# Patient Record
(demographics unavailable — no encounter records)

---

## 2024-10-18 NOTE — ASSESSMENT
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  #Neck Pain/spasms - C/w lorazepam 0.5mg PO TID PRN - Tizanidine 2mg PO q8h PRN  #Secretions - ATropine drops PRN  #ACP Discussed disease trajectory again today and specifically PEG tube placement. Has an appointment next week for G tube consultation. On discussing with patient, she was able to state "I don't think I want a feeding tube." We discussed that though she is able to get adequate calories now, she will lose her ability to swallow at some point and a decision about a future peg tube should be made now regardless of whether she is losing or gaining weight. I explained that many people choose not to get a feeding tube but if she feels that she would want one when her swallowing completely fails, she should get it now rather than wait for that to happen. She and  said they will think about it and would like a follow-up conversation with me once they go for their consultation. - Will discuss further GOC regarding PEG at next visit as well as discussing overarching goals and role of hospice.  F/u in 1 week -remote history  -EKG NSR at 69,  -not on AC -endometrial ca in 2001, with re-occurance 2015,   -currently on chemotherapy weekly (Cisplatin, Doxorubin, Adriamycin)   -Oncologist Dr. SCOT Dumont at [facility]  -F/U [facility] Recs -endometrial ca in 2001, with re-occurance 2015,   -currently on chemotherapy weekly (Cisplatin, Doxorubin, Adriamycin)   -Oncologist Dr. SCOT Dumont at [facility]  -F/U [facility] Recs -Chronic, stable  -continue home amlodipine, losartan with hold parameters

## 2024-10-18 NOTE — HISTORY OF PRESENT ILLNESS
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  Patient was diagnosed with PD by Dr. Yoder in Jan 2020 with initial symptom of left sided weakness, gait instability and FOG> Pt had a Brayan scan on 5/25/2021 which revealed findings consistent with Parkinsonian syndrome, right worse than the left. Was diagnosed with MSA-P while in  rehab confirmed by FDG-PET in July 2024.  Pt does stationary Thera cycle at home for 30 minutes 5-6 x a week. Pt currently participates in PT 2 x a week at home: 30-40 minutes each session Unable to participate in rock steady boxing at this time  Pt states sleep has worsened since weaned off lexapro, about 5- 6 hrs/night. Pt has difficulty staying asleep. She wakes up once a night. Pt states she experiences vivid dreams nightly. Pts  reports screaming and mumbling in her sleep 1-2 a month. Pt is currently on Clonazepam 0.5 mg QHS and Mirtazapine 30 mg. Pt states she experiences intermittent constipation and bowel incontinence (1- 2 x a month). Pt has a BM every 2 days. She currently Miralax QAM and Colace daily.  Providers: Neurologist - Dr. Segura ([facility])  Interval Hx: Was able to go out yesterday all afternoon. Went to the hairdresser and dinner with friends at a restaurant. Has started drinking Boosts as supplemental nutrition and  states patient gained a pound since last visit.  SYMPTOMS: #Secretions/drooling Drools excessively. Uses atropine drops with limited relief. Underwent parotid gland botox without much relief either. Stopped glycopyrrolate due to excessive mouth dryness.  #Pain Location - Neck Quality - Aching Radiation - None Timing - Constant Aggravating factors - None Minimal acceptable level (0-10 scale) - 3/10 Severity in last 24h (0-10 scale) - "severe" Current score (0-10 scale) - 5/10 Worse in the morning. Has head droop to the right and pain manifests there. Takes lorazepam 0.5mg PO BID PRN which helps slightly. Tried tizanidine 2mg at night with some improvement in morning pain. ISTOP Ref #: [id number]   #Weightloss Difficulty chewing and swallowing food and has lost significant weight over the past few months but by adding Boosts and increasing milkshakes, has increased weight by a pound.  #Anxiety/Depression On mirtazapine 30mg PO nightly and lorazepam 0.5mg PO BID PRN but lorazepam is for neck pain.  #Constipation Pt states she experiences intermittent constipation and bowel incontinence (1- 2 x a month). Pt has a BM every 2 days. She currently Miralax QAM and Colace daily.  #Insomnia She wakes up once a night due to neck pain. Pt states she experiences vivid dreams nightly. Pts  reports screaming and mumbling in her sleep 1-2 a month. Pt is currently on Mirtazapine 30 mg PO daily.  ROS: If [ ] blank, symptom not present Fatigue [ ] Nausea [ ] Loss of appetite [ ] Unintentional weight loss [ ] Constipation [ ] Diarrhea [ ] Anxiety [ ] Low mood [ ] Other symptoms: [x ] All other review of symptoms negative   SH: Lives with . No children. Limited family support other than her . Has a few hours of a HHA per day.   Advanced Directives: HCP - Coy Ewing () MOLST - none

## 2024-10-18 NOTE — PHYSICAL EXAM
[General Appearance - Alert] : alert [General Appearance - In No Acute Distress] : in no acute distress [Sclera] : the sclera and conjunctiva were normal [Outer Ear] : the ears and nose were normal in appearance [Hearing Threshold Finger Rub Not [place]] : hearing was normal [] : no respiratory distress [Respiration, Rhythm And Depth] : normal respiratory rhythm and effort [Impaired Insight] : insight and judgment were intact [FreeTextEntry1] : Tearful during interview, depressed mood

## 2024-10-25 NOTE — PHYSICAL EXAM
[General Appearance - Alert] : alert [General Appearance - In No Acute Distress] : in no acute distress [Sclera] : the sclera and conjunctiva were normal [Outer Ear] : the ears and nose were normal in appearance [Hearing Threshold Finger Rub Not [place]] : hearing was normal [] : no respiratory distress [Respiration, Rhythm And Depth] : normal respiratory rhythm and effort [FreeTextEntry1] : Verbal but very low volume, head droop to R side [Impaired Insight] : insight and judgment were intact [Affect] : the affect was normal

## 2024-10-25 NOTE — ASSESSMENT
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  #Neck Pain/spasms - Clonazepam 0.5mg PO TID PRN - Tizanidine 2mg PO q8h PRN  #Secretions - ATropine drops PRN  #Insomnia - Clonazepam 0.5mg PO nightly PRN  #Poor nutritional intake - Nutritionist referral placed by neuro. Will go for consultation - GI referral placed. Awaiting medical records to be sent to [facility] for further evaluation  #ACP Discussed disease trajectory again today and specifically PEG tube placement but unable to get evaluation this week. - Will f/u nutritional GOC after medical records sent to GI and has ability to get evaluated  F/u after GI visit

## 2024-10-25 NOTE — HISTORY OF PRESENT ILLNESS
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  Patient was diagnosed with PD by Dr. Yoder in Jan 2020 with initial symptom of left sided weakness, gait instability and FOG> Pt had a Brayan scan on 5/25/2021 which revealed findings consistent with Parkinsonian syndrome, right worse than the left. Was diagnosed with MSA-P while in  rehab confirmed by FDG-PET in July 2024.  Pt does stationary Thera cycle at home for 30 minutes 5-6 x a week. Pt currently participates in PT 2 x a week at home: 30-40 minutes each session Unable to participate in rock steady boxing at this time  Pt states sleep has worsened since weaned off lexapro, about 5- 6 hrs/night. Pt has difficulty staying asleep. She wakes up once a night. Pt states she experiences vivid dreams nightly. Pts  reports screaming and mumbling in her sleep 1-2 a month. Pt is currently on Clonazepam 0.5 mg QHS and Mirtazapine 30 mg. Pt states she experiences intermittent constipation and bowel incontinence (1- 2 x a month). Pt has a BM every 2 days. She currently Miralax QAM and Colace daily.  Providers: Neurologist - Dr. Segura ([facility])  Interval Hx: Went for her GI eval but didn't have any of her medical records from [facility] and neurology (GI was at [facility]) so couldn't be evaluated for feeding tube. Neuro referred patient to a nutritionist. Has been able to swallow soft foods and liquids but says that she often doesn't feel like eating and so it's difficult to get adequate nutrition.  SYMPTOMS: #Secretions/drooling Drools excessively. Uses atropine drops with limited relief. Underwent parotid gland botox without much relief either. Stopped glycopyrrolate due to excessive mouth dryness.  #Pain Location - Neck Quality - Aching Radiation - None Timing - Constant Aggravating factors - None Minimal acceptable level (0-10 scale) - 3/10 Severity in last 24h (0-10 scale) - "moderate" Current score (0-10 scale) - 5/10 Worse in the morning. Has head droop to the right and pain manifests there. Takes lorazepam 0.5mg PO BID PRN which helps slightly. Tried tizanidine 2mg at night with some improvement in morning pain. ISTOP Ref #: [id number]   #Weightloss Difficulty chewing and swallowing food and has lost significant weight over the past few months but by adding Boosts and increasing milkshakes, has increased weight by a pound.  #Anxiety/Depression On mirtazapine 30mg PO nightly and lorazepam 0.5mg PO BID PRN but lorazepam is for neck pain.  #Constipation Pt states she experiences intermittent constipation and bowel incontinence (1- 2 x a month). Pt has a BM every 2 days. She currently Miralax QAM and Colace daily.  #Insomnia She wakes up once a night due to neck pain. Pt states she experiences vivid dreams nightly. Pts  reports screaming and mumbling in her sleep 1-2 a month. Pt is currently on Mirtazapine 30 mg PO daily. Started clonasepam 0.5mg at night this week and it helped her sleep much better at night.  ROS: If [ ] blank, symptom not present Fatigue [ ] Nausea [ ] Loss of appetite [ ] Unintentional weight loss [ ] Constipation [ ] Diarrhea [ ] Anxiety [ ] Low mood [ ] Other symptoms: [x ] All other review of symptoms negative   SH: Lives with . No children. Limited family support other than her . Has a few hours of a HHA per day.   Advanced Directives: HCP - Coy Ewing () MOLST - none

## 2025-01-24 NOTE — ASSESSMENT
[FreeTextEntry1] : Assessment:  64-year-old female with a stage 3 pressure ulcer in the sacral region secondary to immobility and underlying multiple system atrophy. The patient is receiving appropriate topical care and has upcoming appointments with wound care and dermatology specialists. The  is concerned about infection risks and seeks reassurance and guidance.  Differential:  Risk of infection due to wound stage and location. Potential delayed wound healing due to underlying systemic disease and limited mobility. Caregiver stress and need for additional support. Plan:  Wound Management: Continue with current topical regimen (TriDerma, A&D ointment). Reinforce the importance of consistent use of Demora Sacrum silicone bandages. Educate caregiver on proper wound cleaning techniques: Clean the wound gently with saline before each dressing change. Avoid harsh antiseptics. If signs of infection arise (e.g., redness, swelling, warmth, foul odor, purulent discharge), contact the healthcare team promptly for wound culture and systemic antibiotics. Pressure Relief: Encourage frequent repositioning every 2 hours while in bed and every 15 minutes while seated.   Recommend a pressure-relieving mattress or cushion to reduce further risk. Nutrition and Hydration: Increase dietary protein and incorporate vitamins (A, C) and zinc to promote healing. Encourage adequate fluid intake to maintain hydration. Caregiver Support: Address 's anxiety with reassurance and education about wound healing expectations. Provide resources for caregiver support groups or counseling if stress persists. Encourage participation in specialist appointments to address his concerns. Specialist Follow-Up: Ensure timely follow-up with wound care specialist and dermatologist for advanced interventions, if needed. Discuss additional therapies such as negative pressure wound therapy (NPWT) or growth factors based on wound healing progress. Prevention of Recurrence: Maintain regular hygiene and use moisture barrier creams to protect skin from incontinence. Monitor for early signs of new pressure ulcers. Encourage physical therapy as tolerated to improve mobility and circulation. Follow-Up: Schedule a re-evaluation in one week or sooner if there are signs of infection or deterioration in wound status. Ensure specialists' recommendations are integrated into the care plan.

## 2025-01-24 NOTE — HISTORY OF PRESENT ILLNESS
[Home] : at home, [unfilled] , at the time of the visit. [Other Location: e.g. Home (Enter Location, City,State)___] : at [unfilled] [Verbal consent obtained from patient] : the patient, [unfilled] [FreeTextEntry8] : Subjective:  A 64-year-old female with a history of multiple system atrophy (Parkinsonian type) presents with a stage 3 pressure ulcer located above the anus, at the left side of the base of the tailbone. The patient's , who is actively involved in her care, has a picture of the wound on his iPad. The patient has been applying TriDerma, A&D ointment, and a Demora Sacrum silicone bandage to the affected area. Her  expressed significant anxiety over the risk of infection despite their efforts at wound care.    ROS as above otherwise neg   Physical Exam   General: Awake, alert and oriented. No acute distress. Well-developed, hydrated, and nourished. Appears stated age. Hestancy when speaking HEENT: NCAT, EOMI, neck with normal ROM, no visible masses Cardiopulmonary: RR wnl, no audible wheezes, no signs of respiratory distress Neurological: The patient is awake with slow speech. Thought process is intact. Extremities: atraumatic in appearance, moving upper extremities normally Skin: seen in picture with stage 3 decub, beefy, erythematous

## 2025-03-07 NOTE — HISTORY OF PRESENT ILLNESS
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 Accompanied by  Coy.  Patient was diagnosed with PD by Dr. Yoder in Jan 2020 with initial symptom of left sided weakness, gait instability and FOG> Pt had a Brayan scan on 5/25/2021 which revealed findings consistent with Parkinsonian syndrome, right worse than the left. Was diagnosed with MSA-P while in  rehab confirmed by FDG-PET in July 2024.  Providers: Neurologist - Dr. Segura (Movement Disorders) PCP - Dr. Levin ([facility])  Interval Hx: Weight 93lbs this week (was 98lbs 2 weeks ago). Has been having worsening dysphagia but still has days when able to chew and swallow solid foods.  SYMPTOMS: #Secretions/drooling Drools excessively. Uses atropine drops with limited relief. Underwent parotid gland botox without much relief either. Stopped glycopyrrolate due to excessive mouth dryness.  #Pain Location - Neck Quality - Aching Radiation - None Timing - Constant Aggravating factors - None Minimal acceptable level (0-10 scale) - 3/10 Severity in last 24h (0-10 scale) - 9/10 Current score (0-10 scale) - 6/10 Worse in the morning. Has head droop to the right and pain manifests there. Takes lorazepam 0.5mg PO TID PRN (only once a day usually) which helps slightly. Tried tizanidine 2mg at twice daily which helps with tension in neck. ISTOP Ref #: [id number]   #Weightloss Difficulty chewing and swallowing food and has lost significant weight over the past few months. Having more difficulty with solids but some days is still able to chew and swallow steak whereas other days can only drink liquids.  #Anxiety/Depression On mirtazapine 30mg PO nightly and lorazepam 0.5mg PO BID PRN but lorazepam is for neck pain.  #Diarrhea Having intermittently diarrhea.  #Insomnia Difficulty falling asleep. Feels it is due to pain from wound but has started using wedges at night from wound care and that alleviates pain from lying on sacral wound. Pt is currently on Mirtazapine 30 mg PO daily.  ROS: If [ ] blank, symptom not present Fatigue [ ] Nausea [ ] Loss of appetite [ ] Unintentional weight loss [ ] Constipation [ ] Diarrhea [ ] Anxiety [ ] Low mood [ ] Other symptoms: [x ] All other review of symptoms negative   SH: Lives with . No children. Limited family support other than her . Has a few hours of a HHA per day.   Advanced Directives: HCP - Coy Ewing () MOLST - none

## 2025-03-07 NOTE — ASSESSMENT
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  #Neck Pain/spasms - Clonazepam 0.5mg PO TID PRN - Tizanidine 2mg PO q8h PRN  #Secretions - Atropine drops PRN  #Insomnia - Clonazepam 0.5mg PO nightly PRN  #Poor nutritional intake - F/u with nutritionist. Already established care with them. - No longer interested in feeding tube  #ACP Discussed disease trajectory again today and specifically PEG tube placement. Meredith endorsed that she is no longer interested in a feeding tube when her dysphagia worsens and she is unable to swallow liquids. - GOC ongoing - No feeding tube  F/u in 1 month

## 2025-03-07 NOTE — REASON FOR VISIT
[Home] : at home, [unfilled] , at the time of the visit. [Other Location: e.g. Home (Enter Location, City,State)___] : at [unfilled] [Verbal consent obtained from patient] : the patient, [unfilled] [This encounter was initiated by telehealth (audio with video) and converted to telephone (audio only)] : This encounter was initiated by telehealth (audio with video) and converted to telephone (audio only) [Technical] : patient unable to effectively utilize tele-video due to technical issues.

## 2025-03-31 NOTE — HISTORY OF PRESENT ILLNESS
[Home] : at home, [unfilled] , at the time of the visit. [Medical Office: ([facility])___] : at the medical office located in  [Telehealth (audio & video)] : This visit was provided via telehealth using real-time 2-way audio visual technology. [Verbal consent obtained from patient] : the patient, [unfilled] [FreeTextEntry1] : - 64 yo F with multiple system atrophy (Parkinsonian type) here for symptom management and GOC discussion. Accompanied by  Coy.  Patient was diagnosed with PD by Dr. Yoder in Jan 2020 with initial symptom of left sided weakness, gait instability and FOG> Pt had a Brayan scan on 5/25/2021 which revealed findings consistent with Parkinsonian syndrome, right worse than the left. Was diagnosed with MSA-P while in  rehab confirmed by FDG-PET in July 2024.  Providers: Neurologist - Dr. Segura (Movement Disorders) PCP - Dr. Lvein ([facility])  Interval Hx: Weight 89lbs today, down from 98lbs 1-2 months ago. Has been having worsening dysphagia but still has days when able to chew and swallow solid foods.  SYMPTOMS: #Secretions/drooling Drools excessively. Uses atropine drops with limited relief.  #Pain Location - Neck Quality - Aching Radiation - None Timing - Constant Aggravating factors - None Minimal acceptable level (0-10 scale) - 3/10 Severity in last 24h (0-10 scale) - 9/10 Current score (0-10 scale) - 6/10 Worse in the morning. Has head droop to the right and pain manifests there. Takes lorazepam 0.5mg PO TID PRN (only once a day usually) which helps slightly. Tried tizanidine 2mg at twice daily which helps with tension in neck. ISTOP Ref #: [id number]   #Weightloss Difficulty chewing and swallowing food and has lost significant weight over the past few months. Sometimes can eat a dinner of rice and pork chops but other times is unable to. Still being given foods that require a substantial amount of chewing, such as peanut butter sandwiches on crusty bread. Weight appears to have decreased by 4 pounds per  since last month.  #Anxiety/Depression On mirtazapine 30mg PO nightly and lorazepam 0.5mg PO BID PRN but lorazepam is for neck pain.  #Insomnia Difficulty falling asleep. Feels it is due to pain from wound but has started using wedges at night from wound care and that alleviates pain from lying on sacral wound. Pt is currently on Mirtazapine 30 mg PO daily.  ROS: If [ ] blank, symptom not present Fatigue [ ] Nausea [ ] Loss of appetite [ ] Unintentional weight loss [ ] Constipation [ ] Diarrhea [ ] Anxiety [ ] Low mood [ ] Other symptoms: [x ] All other review of symptoms negative   SH: Lives with . No children. Limited family support other than her . Has a few hours of a HHA per day.   Advanced Directives: HCP - Coy Ewing () MOLST - none

## 2025-03-31 NOTE — GOALS
[Patient] : patient [DNR/Trial Intubation] : DNR/Trial Intubation [MOLST Completed] : MOLST Completed [Time Spent: ___ minutes] : I, personally, spent [unfilled] minutes on advance care planning services with the patient.  This time is separate and distinct from any other care management services provided on this date [FreeTextEntry1] : Coy Ewing [FreeTextEntry2] :  [FreeTextEntry7] : Spoke again about her wishes regarding a feeding tube and she expressed that she does not want a feeding tube. I explained that as her swallowing worsens, this may limit her life expectancy and though she was tearful, she shook her head that she still did not want a feeding tube. We also discussed code status and her  explained that she had previously expressed a wish to be DNR with an  in a living will. I asked if that was still her wish, discussing the morbidity of CPR with her, and she stated that it was and she did not want CPR. [de-identified] : No feeding tube [LastACPVerDate] : 03/31/2025

## 2025-03-31 NOTE — PHYSICAL EXAM
[General Appearance - Alert] : alert [General Appearance - In No Acute Distress] : in no acute distress [Sclera] : the sclera and conjunctiva were normal [] : no respiratory distress [Respiration, Rhythm And Depth] : normal respiratory rhythm and effort [FreeTextEntry1] : Verbal but very low volume, head droop to R side [Impaired Insight] : insight and judgment were intact [Affect] : the affect was normal

## 2025-03-31 NOTE — ASSESSMENT
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  #Neck Pain/spasms - Clonazepam 0.5mg PO TID PRN - Tizanidine 2mg PO q8h PRN  #Secretions - Atropine drops PRN  #Insomnia - Clonazepam 0.5mg PO nightly PRN  #Poor nutritional intake - F/u with nutritionist. Already established care with them. - No longer interested in feeding tube  #ACP See separate GOC note for further details. - MOLST: DNR, no feeding tube  F/u in 1-2 months

## 2025-03-31 NOTE — GOALS
[Patient] : patient [DNR/Trial Intubation] : DNR/Trial Intubation [MOLST Completed] : MOLST Completed [Time Spent: ___ minutes] : I, personally, spent [unfilled] minutes on advance care planning services with the patient.  This time is separate and distinct from any other care management services provided on this date [FreeTextEntry1] : Coy Ewing [FreeTextEntry2] :  [FreeTextEntry7] : Spoke again about her wishes regarding a feeding tube and she expressed that she does not want a feeding tube. I explained that as her swallowing worsens, this may limit her life expectancy and though she was tearful, she shook her head that she still did not want a feeding tube. We also discussed code status and her  explained that she had previously expressed a wish to be DNR with an  in a living will. I asked if that was still her wish, discussing the morbidity of CPR with her, and she stated that it was and she did not want CPR. [de-identified] : No feeding tube [LastACPVerDate] : 03/31/2025

## 2025-03-31 NOTE — HISTORY OF PRESENT ILLNESS
[Home] : at home, [unfilled] , at the time of the visit. [Medical Office: ([facility])___] : at the medical office located in  [Telehealth (audio & video)] : This visit was provided via telehealth using real-time 2-way audio visual technology. 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 Accompanied by  Coy.  Patient was diagnosed with PD by Dr. Yoder in Jan 2020 with initial symptom of left sided weakness, gait instability and FOG> Pt had a Brayan scan on 5/25/2021 which revealed findings consistent with Parkinsonian syndrome, right worse than the left. Was diagnosed with MSA-P while in  rehab confirmed by FDG-PET in July 2024.  Providers: Neurologist - Dr. Segura ([facility]) PCP - Dr. Levin ([facility])  Interval Hx: Weight 89lbs today, down from 98lbs 1-2 months ago. Has been having worsening dysphagia but still has days when able to chew and swallow solid foods.  SYMPTOMS: #Secretions/drooling Drools excessively. Uses atropine drops with limited relief.  #Pain Location - Neck Quality - Aching Radiation - None Timing - Constant Aggravating factors - None Minimal acceptable level (0-10 scale) - 3/10 Severity in last 24h (0-10 scale) - 9/10 Current score (0-10 scale) - 6/10 Worse in the morning. Has head droop to the right and pain manifests there. Takes lorazepam 0.5mg PO TID PRN (only once a day usually) which helps slightly. Tried tizanidine 2mg at twice daily which helps with tension in neck. ISTOP Ref #: [id number]   #Weightloss Difficulty chewing and swallowing food and has lost significant weight over the past few months. Sometimes can eat a dinner of rice and pork chops but other times is unable to. Still being given foods that require a substantial amount of chewing, such as peanut butter sandwiches on crusty bread. Weight appears to have decreased by 4 pounds per  since last month.  #Anxiety/Depression On mirtazapine 30mg PO nightly and lorazepam 0.5mg PO BID PRN but lorazepam is for neck pain.  #Insomnia Difficulty falling asleep. Feels it is due to pain from wound but has started using wedges at night from wound care and that alleviates pain from lying on sacral wound. Pt is currently on Mirtazapine 30 mg PO daily.  ROS: If [ ] blank, symptom not present Fatigue [ ] Nausea [ ] Loss of appetite [ ] Unintentional weight loss [ ] Constipation [ ] Diarrhea [ ] Anxiety [ ] Low mood [ ] Other symptoms: [x ] All other review of symptoms negative   SH: Lives with . No children. Limited family support other than her . Has a few hours of a HHA per day.   Advanced Directives: HCP - Coy Ewing () MOLST - none

## 2025-03-31 NOTE — ASSESSMENT
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  #Neck Pain/spasms - Clonazepam 0.5mg PO TID PRN - Tizanidine 2mg PO q8h PRN  #Secretions - Atropine drops PRN  #Insomnia - Clonazepam 0.5mg PO nightly PRN  #Poor nutritional intake - F/u with nutritionist. Already established care with them. - No longer interested in feeding tube  #ACP See separate GOC note for further details. - MOLST: DNR, no feeding tube  F/u in 1-2 months

## 2025-04-03 NOTE — ASSESSMENT
[FreeTextEntry1] : 65F with excessive salivation with inability to tolerate oral secretions and nasal drainage. She has multiple system atrophy (Parkinsonian type) and is seen by neuro and palliative care. Her sx have all progressively worsened as her underlying condition has progressed. She is losing weight and is having progressive dysphagia and immobility and overall worsening cachexia. She uses atropine drops which do not help much she has also had botox to salivary lands which did not provide much extended relief. She is exclusively feeds with PO intake - gtube has been recommended but pt refuses. Head and neck exam is unremarkable. Pt is cachectic w difficulty holding her head up with low and weak voice. Her dysphagia and oronasal sx are due to her underlying progressive neurologic condition. She can atrovent nasal sprays and perhaps try more botox to salivary glands, but these are all temporary fixes which may give some short term relief. I advised her to followup closely with her PCP and neurologist to establish a plan and GOC. I reached out to neurology to expedite an appt soon - they said they will email me back within the week.

## 2025-04-03 NOTE — HISTORY OF PRESENT ILLNESS
[de-identified] : 65F here for initial evaluation.  She was referred for excessive salivation with inability to tolerate oral secretions and nasal drainage. She has uses atropine drops which do not help much she has also had botox to salivary glands which did not provide much extended relief.  She has multiple system atrophy (Parkinsonian type) and is seen by neuro and palliative care.  She is losing weight and is having progressive dysphagia and immobility and overall worsening cachexia. Exclusively feeds with PO intake - gtube has been recommended but pt refuses.

## 2025-05-12 NOTE — PHYSICAL EXAM
[General Appearance - Alert] : alert [General Appearance - In No Acute Distress] : in no acute distress [FreeTextEntry1] : Head drooping to R side [] : no respiratory distress [Respiration, Rhythm And Depth] : normal respiratory rhythm and effort [Impaired Insight] : insight and judgment were intact [Affect] : the affect was normal

## 2025-05-12 NOTE — HISTORY OF PRESENT ILLNESS
[FreeTextEntry1] : - 66 yo F with multiple system atrophy (Parkinsonian type) here for symptom management and GOC discussion. Accompanied by  Coy.  Patient was diagnosed with PD by Dr. Yoder in Jan 2020 with initial symptom of left sided weakness, gait instability and FOG> Pt had a Brayan scan on 5/25/2021 which revealed findings consistent with Parkinsonian syndrome, right worse than the left. Was diagnosed with MSA-P while in  rehab confirmed by FDG-PET in July 2024.  Providers: Neurologist - Dr. Segura ([facility]) PCP - Dr. Levin ([facility])  Interval Hx: Weight stable at 89 pounds. Started speech therapy and PT. Getting services via [facility]. Still getting wound care.  SYMPTOMS: #Secretions/drooling Drools excessively but better managed on atropine drops and glycopyrrolate 1mg PO TID.  #Pain Location - Neck Quality - Aching Radiation - None Timing - Constant Aggravating factors - None Minimal acceptable level (0-10 scale) - 3/10 Severity in last 24h (0-10 scale) - 6/10 Current score (0-10 scale) - 4/10 Worse in the morning. Has head droop to the right and pain manifests there. Takes lorazepam 0.5mg PO TID PRN (only once a day usually) which helps slightly. Also uses tizanidine 2mg at most twice daily, usually once in the morning with a heating pad, which helps with tension in neck. ISTOP Ref #: [id number]   #Weightloss Difficulty chewing and swallowing food but there are certain solid foods she can eat. Some days are better than others so she will eat well but then eat poorly on other days. Weight has been stable the past 6 weeks. Sees a nutritionist as well.  #Anxiety/Depression On mirtazapine 30mg PO nightly and lorazepam 0.5mg PO BID PRN but lorazepam is for neck pain.  #Insomnia Difficulty falling asleep. Feels it is due to pain from wound but has started using wedges at night from wound care and that alleviates pain from lying on sacral wound. Pt is currently on Mirtazapine 30 mg PO daily.  ROS: If [ ] blank, symptom not present Fatigue [ ] Nausea [ ] Loss of appetite [ ] Unintentional weight loss [ ] Constipation [ ] Diarrhea [ ] Anxiety [ ] Low mood [ ] Other symptoms: [x ] All other review of symptoms negative   SH: Lives with . No children. Limited family support other than her . Has a few hours of a HHA per day.   Advanced Directives: HCP - Coy Ewing () MOLST - none

## 2025-05-12 NOTE — ASSESSMENT
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  #Neck Pain/spasms - Lorazepam 0.5mg PO TID PRN - Tizanidine 2mg PO q8h PRN  #Secretions - Atropine drops PRN  #Insomnia - Lorazepam 0.5mg PO nightly PRN  #Poor nutritional intake Intake fluctuates but weight has been stable recently. - F/u with nutritionist. Has already established care with them. - Focusing more on soft and liquid nutrition - Not interested in feeding tube  #ACP See separate GOC note from 3/31/25 for further details. Today discussed with  how she is not yet ready to stop rehabilitation services and not ready for hospice. However, she does have bad days where she feels that she doesn't want to continue. Then good days come around and she feels better and is not ready for hospice. - MOLST: DNR, no feeding tube - Not yet ready for hospice  F/u in 1-2 months

## 2025-05-12 NOTE — HISTORY OF PRESENT ILLNESS
[FreeTextEntry1] : - 64 yo F with multiple system atrophy (Parkinsonian type) here for symptom management and GOC discussion. Accompanied by  Coy.  Patient was diagnosed with PD by Dr. Yoder in Jan 2020 with initial symptom of left sided weakness, gait instability and FOG> Pt had a Brayan scan on 5/25/2021 which revealed findings consistent with Parkinsonian syndrome, right worse than the left. Was diagnosed with MSA-P while in  rehab confirmed by FDG-PET in July 2024.  Providers: Neurologist - Dr. Segura ([facility]) PCP - Dr. Levin ([facility])  Interval Hx: Weight stable at 89 pounds. Started speech therapy and PT. Getting services via [facility]. Still getting wound care.  SYMPTOMS: #Secretions/drooling Drools excessively but better managed on atropine drops and glycopyrrolate 1mg PO TID.  #Pain Location - Neck Quality - Aching Radiation - None Timing - Constant Aggravating factors - None Minimal acceptable level (0-10 scale) - 3/10 Severity in last 24h (0-10 scale) - 6/10 Current score (0-10 scale) - 4/10 Worse in the morning. Has head droop to the right and pain manifests there. Takes lorazepam 0.5mg PO TID PRN (only once a day usually) which helps slightly. Also uses tizanidine 2mg at most twice daily, usually once in the morning with a heating pad, which helps with tension in neck. ISTOP Ref #: [id number]   #Weightloss Difficulty chewing and swallowing food but there are certain solid foods she can eat. Some days are better than others so she will eat well but then eat poorly on other days. Weight has been stable the past 6 weeks. Sees a nutritionist as well.  #Anxiety/Depression On mirtazapine 30mg PO nightly and lorazepam 0.5mg PO BID PRN but lorazepam is for neck pain.  #Insomnia Difficulty falling asleep. Feels it is due to pain from wound but has started using wedges at night from wound care and that alleviates pain from lying on sacral wound. Pt is currently on Mirtazapine 30 mg PO daily.  ROS: If [ ] blank, symptom not present Fatigue [ ] Nausea [ ] Loss of appetite [ ] Unintentional weight loss [ ] Constipation [ ] Diarrhea [ ] Anxiety [ ] Low mood [ ] Other symptoms: [x ] All other review of symptoms negative   SH: Lives with . No children. Limited family support other than her . Has a few hours of a HHA per day.   Advanced Directives: HCP - Coy Ewing () MOLST - none

## 2025-06-25 NOTE — ASSESSMENT
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  #Neck Pain/spasms - Lorazepam 0.5mg PO TID PRN - Tizanidine 2mg PO q8h PRN  #Secretions - Atropine drops PRN - Glycopyrrolate 1mg PO TID PRN  #Insomnia - Lorazepam 0.5mg PO nightly PRN  #Poor nutritional intake Intake fluctuates but weight has been stable recently. - F/u with nutritionist. Has already established care with them. - Focusing more on soft and liquid nutrition - Not interested in feeding tube  #ACP See GOC note from 3/31/25. Discussed hospice again today given her clinical decline over the past 1-2 months. She is not going out anymore and is getting weaker. We discussed hospice again, especially given her recurrent hypotensive episodes and wish to forego further ER evaluation or treatment in the hospital. Her goal is to stay at home and be comfortable. Initially, both Coy and Meredith were agreeable to hospice but after our visit, patient's  called back to ask about the ability to do PT on hospice. I explained that it is not covered by hospice and because he feels Meredith is getting significant enjoyment out of doing PT, he does not want to stop that to enroll in hospice yet. - MOLST: DNR, trial of intubation, no feeding tube - Hospice referral on hold.  F/u in 1 month

## 2025-06-25 NOTE — ASSESSMENT
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  #Neck Pain/spasms - Lorazepam 0.5mg PO TID PRN - Tizanidine 2mg PO q8h PRN  #Secretions - Atropine drops PRN - Glycopyrrolate 1mg PO TID PRN  #Insomnia - Lorazepam 0.5mg PO nightly PRN  #Poor nutritional intake Intake fluctuates but weight has been stable recently. - F/u with nutritionist. Has already established care with them. - Focusing more on soft and liquid nutrition - Not interested in feeding tube  #ACP See GOC note from 3/31/25. Discussed hospice again today given her clinical decline over the past 1-2 months. She is not going out anymore and is getting weaker. We discussed hospice again, especially given her recurrent hypotensive episodes and wish to forego further ER evaluation or treatment in the hospital. Her goal is to stay at home and be comfortable. Initially, both Coy and Meredith were agreeable to hospice but after our visit, patient's  called back to ask about the ability to do PT on hospice. I explained that it is not covered by hospice and because he feels Meredith is getting significant enjoyment out of doing PT, he does not want to stop that to enroll in hospice yet. - MOLST: DNR, trial of intubation, no feeding tube - Hospice referral on hold.  F/u in 1 month

## 2025-06-25 NOTE — HISTORY OF PRESENT ILLNESS
[Home] : at home, [unfilled] , at the time of the visit. [Medical Office: ([facility])___] : at the medical office located in  [Telehealth (audio & video)] : This visit was provided via telehealth using real-time 2-way audio visual technology. 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 Accompanied by  Coy.  Patient was diagnosed with PD by Dr. Yoder in Jan 2020 with initial symptom of left sided weakness, gait instability and FOG> Pt had a Brayan scan on 5/25/2021 which revealed findings consistent with Parkinsonian syndrome, right worse than the left. Was diagnosed with MSA-P while in  rehab confirmed by FDG-PET in July 2024.  Providers: Neurologist - Dr. Segura ([facility]) PCP - Dr. Levin ([facility])  Interval Hx: Less active, unable to get out of the house over this past month. Intermittently eating solid foods. Still enjoying PT though she is able to do less physically. Has been having episodes of hypotension at home, down to SBP of 70s-80s which then improve. When they have alerted me to this, we discussed going to the ER but they are not interested in further hospital care and want patient to stay home and be kept comfortable.  SYMPTOMS: #Secretions/drooling Drools excessively but better managed on atropine drops and glycopyrrolate 1mg PO TID. No longer getting botox injections.  #Pain Location - Neck Quality - Aching Radiation - None Timing - Constant Aggravating factors - None Minimal acceptable level (0-10 scale) - 3/10 Severity in last 24h (0-10 scale) - 6/10 Current score (0-10 scale) - 4/10 Worse in the morning. Has head droop to the right and pain manifests there. Takes lorazepam 0.5mg PO TID PRN (only once a day usually) which helps slightly. Also uses tizanidine 2mg at most twice daily, usually once in the morning with a heating pad, which helps with tension in neck. ISTOP Ref #: [id number]   #Weightloss Difficulty chewing and swallowing food but there are certain solid foods she can eat. Some days are better than others so she will eat well but then eat poorly on other days. Follows with a nutritionist.  #Anxiety/Depression On mirtazapine 30mg PO nightly and lorazepam 0.5mg PO BID PRN but lorazepam is for neck pain.  #Insomnia Difficulty falling asleep. Feels it is due to pain from wound but has started using wedges at night from wound care and that alleviates pain from lying on sacral wound. Pt is currently on Mirtazapine 30 mg PO daily.  ROS: If [ ] blank, symptom not present Fatigue [ ] Nausea [ ] Loss of appetite [ ] Unintentional weight loss [ ] Constipation [ ] Diarrhea [ ] Anxiety [ ] Low mood [ ] Other symptoms: [x ] All other review of symptoms negative   SH: Lives with . No children. Limited family support other than her . Has a few hours of a HHA per day.   Advanced Directives: HCP - Coy Ewing () MOLST - DNR/trial of intubation

## 2025-06-25 NOTE — PHYSICAL EXAM
[General Appearance - Alert] : alert [General Appearance - In No Acute Distress] : in no acute distress [] : no respiratory distress [Respiration, Rhythm And Depth] : normal respiratory rhythm and effort [FreeTextEntry1] : Verbal but very low volume speech, head droop to R side [Impaired Insight] : insight and judgment were intact [Affect] : the affect was normal

## 2025-06-25 NOTE — HISTORY OF PRESENT ILLNESS
[Home] : at home, [unfilled] , at the time of the visit. [Medical Office: ([facility])___] : at the medical office located in  [Telehealth (audio & video)] : This visit was provided via telehealth using real-time 2-way audio visual technology. 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 Accompanied by  Coy.  Patient was diagnosed with PD by Dr. Yoder in Jan 2020 with initial symptom of left sided weakness, gait instability and FOG> Pt had a Brayan scan on 5/25/2021 which revealed findings consistent with Parkinsonian syndrome, right worse than the left. Was diagnosed with MSA-P while in  rehab confirmed by FDG-PET in July 2024.  Providers: Neurologist - Dr. Segura ([facility]) PCP - Dr. Levin ([facility])  Interval Hx: Less active, unable to get out of the house over this past month. Intermittently eating solid foods. Still enjoying PT though she is able to do less physically. Has been having episodes of hypotension at home, down to SBP of 70s-80s which then improve. When they have alerted me to this, we discussed going to the ER but they are not interested in further hospital care and want patient to stay home and be kept comfortable.  SYMPTOMS: #Secretions/drooling Drools excessively but better managed on atropine drops and glycopyrrolate 1mg PO TID. No longer getting botox injections.  #Pain Location - Neck Quality - Aching Radiation - None Timing - Constant Aggravating factors - None Minimal acceptable level (0-10 scale) - 3/10 Severity in last 24h (0-10 scale) - 6/10 Current score (0-10 scale) - 4/10 Worse in the morning. Has head droop to the right and pain manifests there. Takes lorazepam 0.5mg PO TID PRN (only once a day usually) which helps slightly. Also uses tizanidine 2mg at most twice daily, usually once in the morning with a heating pad, which helps with tension in neck. ISTOP Ref #: [id number]   #Weightloss Difficulty chewing and swallowing food but there are certain solid foods she can eat. Some days are better than others so she will eat well but then eat poorly on other days. Follows with a nutritionist.  #Anxiety/Depression On mirtazapine 30mg PO nightly and lorazepam 0.5mg PO BID PRN but lorazepam is for neck pain.  #Insomnia Difficulty falling asleep. Feels it is due to pain from wound but has started using wedges at night from wound care and that alleviates pain from lying on sacral wound. Pt is currently on Mirtazapine 30 mg PO daily.  ROS: If [ ] blank, symptom not present Fatigue [ ] Nausea [ ] Loss of appetite [ ] Unintentional weight loss [ ] Constipation [ ] Diarrhea [ ] Anxiety [ ] Low mood [ ] Other symptoms: [x ] All other review of symptoms negative   SH: Lives with . No children. Limited family support other than her . Has a few hours of a HHA per day.   Advanced Directives: HCP - Coy Ewing () MOLST - DNR/trial of intubation

## 2025-07-25 NOTE — GOALS
[Patient] : patient [Time Spent: ___ minutes] : I, personally, spent [unfilled] minutes on advance care planning services with the patient.  This time is separate and distinct from any other care management services provided on this date [FreeTextEntry1] : Coy Ewing [FreeTextEntry2] :  [FreeTextEntry7] : Discussed patient's further functional and clinical decline over the past few months. She is already DNR per previous conversations but is not feeling that further therapy like speech therapy or PT are not helping her. She and Coy are ready for hospice. [de-identified] : Hospice referral placed

## 2025-07-25 NOTE — HISTORY OF PRESENT ILLNESS
[Home] : at home, [unfilled] , at the time of the visit. [Medical Office: ([facility])___] : at the medical office located in  [Verbal consent obtained from patient] : the patient, [unfilled] [Telephone (audio)] : This telephonic visit was provided via audio only technology. [Technical] : patient unable to effectively utilize tele-video due to technical issues. [FreeTextEntry1] : - 66 yo F with multiple system atrophy (Parkinsonian type) here for symptom management and GOC discussion. Accompanied by  Coy.  Patient was diagnosed with PD by Dr. Yoder in Jan 2020 with initial symptom of left sided weakness, gait instability and FOG> Pt had a Brayan scan on 5/25/2021 which revealed findings consistent with Parkinsonian syndrome, right worse than the left. Was diagnosed with MSA-P while in  rehab confirmed by FDG-PET in July 2024.  Providers: Neurologist - Dr. Segura ([facility]) PCP - Dr. Levin ([facility])  Interval Hx: Less active, unable to get out of the house. Becoming more and more stiff, lots more freezing episodes. Ability to swallow pills is becoming more compromised but she is still intermittently eating solid foods. No longer able to do PT. Swallow therapist from [facility] suggested hospice and they are more interested today. Still having episodes of hypotension at home, down to SBP of 70s-80s which then improve. She is intermittently taking her midodrine.  SYMPTOMS: #Secretions/drooling Drools excessively but better managed on atropine drops and glycopyrrolate 1mg PO TID. No longer getting botox injections.  #Pain Location - Neck Quality - Aching Radiation - None Timing - Constant Aggravating factors - None Minimal acceptable level (0-10 scale) - 3/10 Severity in last 24h (0-10 scale) - 6/10 Current score (0-10 scale) - 4/10 Worse in the morning. Has head droop to the right and pain manifests there. Takes clonazepam 0.5mg PO TID PRN (only once a day usually) which helps slightly. Also uses tizanidine 2mg at most twice daily but doesn't help as much as it used to. ISTOP Ref #: [id number]   #Anxiety/Depression On mirtazapine 30mg PO nightly and clonazpem 0.5mg PO TID PRN for anxiety. Has been more anxious but feels clonazepam helping significantly with her anxiousness.  ROS: If [ ] blank, symptom not present Fatigue [ ] Nausea [ ] Loss of appetite [ ] Unintentional weight loss [ ] Constipation [ ] Diarrhea [ ] Anxiety [ ] Low mood [ ] Other symptoms: [x ] All other review of symptoms negative   SH: Lives with . No children. Limited family support other than her . Has a few hours of a HHA per day.   Advanced Directives: HCP - Coy Ewing () MOLST - DNR/trial of intubation

## 2025-07-25 NOTE — GOALS
[Patient] : patient [Time Spent: ___ minutes] : I, personally, spent [unfilled] minutes on advance care planning services with the patient.  This time is separate and distinct from any other care management services provided on this date [FreeTextEntry1] : Coy Ewing [FreeTextEntry2] :  [FreeTextEntry7] : Discussed patient's further functional and clinical decline over the past few months. She is already DNR per previous conversations but is not feeling that further therapy like speech therapy or PT are not helping her. She and Coy are ready for hospice. [de-identified] : Hospice referral placed

## 2025-07-25 NOTE — ASSESSMENT
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  #Neck Pain/spasms - Clonazepam 0.5mg PO TID PRN - Increase tizanidine to 4mg PO q8h PRN  #Secretions - Atropine drops PRN - Glycopyrrolate 1mg PO TID PRN  #Anxiety - Mirtazapine 30mg PO nightly - Clonazepam 0.5mg PO TID PRN  #Poor nutritional intake Intake fluctuates - Not interested in artificial nutrition  #ACP See separate GOC note from today for details - [facility]: DNR, trial of intubation, no feeding tube - Patient agreeable to hospice. Referral placed.

## 2025-07-25 NOTE — ASSESSMENT
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  #Neck Pain/spasms - Clonazepam 0.5mg PO TID PRN - Increase tizanidine to 4mg PO q8h PRN  #Secretions - Atropine drops PRN - Glycopyrrolate 1mg PO TID PRN  #Anxiety - Mirtazapine 30mg PO nightly - Clonazepam 0.5mg PO TID PRN  #Poor nutritional intake Intake fluctuates - Not interested in artificial nutrition  #ACP See separate GOC note from today for details - [facility]: DNR, trial of intubation, no feeding tube - Patient agreeable to hospice. Referral placed.

## 2025-07-25 NOTE — PHYSICAL EXAM
[General Appearance - Alert] : alert [General Appearance - In No Acute Distress] : in no acute distress [Impaired Insight] : insight and judgment were intact [Affect] : the affect was normal [FreeTextEntry1] : Anxious mood

## 2025-07-25 NOTE — HISTORY OF PRESENT ILLNESS
[Home] : at home, [unfilled] , at the time of the visit. [Medical Office: ([facility])___] : at the medical office located in  [Verbal consent obtained from patient] : the patient, [unfilled] [Telephone (audio)] : This telephonic visit was provided via audio only technology. [Technical] : patient unable to effectively utilize tele-video due to technical issues. [FreeTextEntry1] : - 64 yo F with multiple system atrophy (Parkinsonian type) here for symptom management and GOC discussion. Accompanied by  Coy.  Patient was diagnosed with PD by Dr. Yoder in Jan 2020 with initial symptom of left sided weakness, gait instability and FOG> Pt had a Brayan scan on 5/25/2021 which revealed findings consistent with Parkinsonian syndrome, right worse than the left. Was diagnosed with MSA-P while in  rehab confirmed by FDG-PET in July 2024.  Providers: Neurologist - Dr. Segura ([facility]) PCP - Dr. Levin ([facility])  Interval Hx: Less active, unable to get out of the house. Becoming more and more stiff, lots more freezing episodes. Ability to swallow pills is becoming more compromised but she is still intermittently eating solid foods. No longer able to do PT. Swallow therapist from [facility] suggested hospice and they are more interested today. Still having episodes of hypotension at home, down to SBP of 70s-80s which then improve. She is intermittently taking her midodrine.  SYMPTOMS: #Secretions/drooling Drools excessively but better managed on atropine drops and glycopyrrolate 1mg PO TID. No longer getting botox injections.  #Pain Location - Neck Quality - Aching Radiation - None Timing - Constant Aggravating factors - None Minimal acceptable level (0-10 scale) - 3/10 Severity in last 24h (0-10 scale) - 6/10 Current score (0-10 scale) - 4/10 Worse in the morning. Has head droop to the right and pain manifests there. Takes clonazepam 0.5mg PO TID PRN (only once a day usually) which helps slightly. Also uses tizanidine 2mg at most twice daily but doesn't help as much as it used to. ISTOP Ref #: [id number]   #Anxiety/Depression On mirtazapine 30mg PO nightly and clonazpem 0.5mg PO TID PRN for anxiety. Has been more anxious but feels clonazepam helping significantly with her anxiousness.  ROS: If [ ] blank, symptom not present Fatigue [ ] Nausea [ ] Loss of appetite [ ] Unintentional weight loss [ ] Constipation [ ] Diarrhea [ ] Anxiety [ ] Low mood [ ] Other symptoms: [x ] All other review of symptoms negative   SH: Lives with . No children. Limited family support other than her . Has a few hours of a HHA per day.   Advanced Directives: HCP - Coy Ewing () M[facility] - DNR/trial of intubation